# Patient Record
Sex: MALE | Race: WHITE
[De-identification: names, ages, dates, MRNs, and addresses within clinical notes are randomized per-mention and may not be internally consistent; named-entity substitution may affect disease eponyms.]

---

## 2018-10-10 ENCOUNTER — HOSPITAL ENCOUNTER (EMERGENCY)
Dept: HOSPITAL 25 - UCCORT | Age: 28
Discharge: HOME | End: 2018-10-10
Payer: COMMERCIAL
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
## 2018-10-10 DIAGNOSIS — T63.481A: ICD-10-CM

## 2018-10-10 DIAGNOSIS — Z87.891: ICD-10-CM

## 2018-10-10 DIAGNOSIS — L53.0: ICD-10-CM

## 2018-10-10 DIAGNOSIS — Z88.1: Primary | ICD-10-CM

## 2018-10-10 PROCEDURE — 99201: CPT

## 2018-10-10 PROCEDURE — G0463 HOSPITAL OUTPT CLINIC VISIT: HCPCS

## 2018-10-10 NOTE — UC
Skin Complaint HPI





- HPI Summary


HPI Summary: 





Found a tick on right upper arm yesterday afternoon.  Removed the tick but is 

concerned that part of it remains.  Thinks tick was attached for several hours 

at most.  Denies any fever, headache, rash, malaise.





- History of Current Complaint


Chief Complaint: UCSkin


Time Seen by Provider: 10/10/18 21:38


Stated Complaint: TICK


Hx Obtained From: Patient


Onset/Duration: Sudden Onset, Lasting Hours


Onset Severity: Mild


Current Severity: Mild


Pain Intensity: 3


Pain Scale Used: 0-10 Numeric


Location: Discrete - RIGHT UPPER ARM


Character: Pain, Redness


Aggravating Factor(s): Touch


Alleviating Factor(s): Nothing


Associated Signs & Symptoms: Positive: Tenderness.  Negative: Nausea, Fever


Related History: Insect Bite/Sting





- Allergy/Home Medications


Allergies/Adverse Reactions: 


 Allergies











Allergy/AdvReac Type Severity Reaction Status Date / Time


 


[facility]  Unknown Verified 10/10/18 21:15





   Reaction  





   Details  


 


cefzil Allergy  Unknown Uncoded 10/10/18 21:15





   Reaction  





   Details  











Home Medications: 


 Home Medications





NK [No Home Medications Reported]  10/10/18 [History Confirmed 10/10/18]











Review of Systems


Constitutional: Negative


Skin: Rash


Respiratory: Negative


Cardiovascular: Negative


Gastrointestinal: Negative


Neurological: Negative


All Other Systems Reviewed And Are Negative: Yes





PMH/Surg Hx/FS Hx/Imm Hx


Previously Healthy: Yes





- Surgical History


Surgical History: Yes


Surgery Procedure, Year, and Place: appy, hypospadia, left knee cartilage





- Family History


Known Family History: 


   Negative: Hypertension





- Social History


Alcohol Use: Weekly


Alcohol Amount: 12


Substance Use Type: None


Smoking Status (MU): Former Smoker





Physical Exam


Triage Information Reviewed: Yes


Appearance: Well-Appearing, No Pain Distress, Well-Nourished


Vital Signs: 


 Initial Vital Signs











Temp  98 F   10/10/18 21:05


 


Pulse  62   10/10/18 21:05


 


Resp  22   10/10/18 21:05


 


BP  139/73   10/10/18 21:05


 


Pulse Ox  99   10/10/18 21:05











Vital Signs Reviewed: Yes


Eyes: Positive: Conjunctiva Clear


ENT: Positive: Hearing grossly normal


Neck: Positive: Supple


Respiratory: Positive: No respiratory distress, No accessory muscle use


Cardiovascular: Positive: Pulses Normal


Abdomen Description: Positive: Soft


Musculoskeletal: Positive: No Edema


Neurological: Positive: Alert


Psychological: Positive: Age Appropriate Behavior


Skin: Positive: rashes - 1.5CM AREA OF ERYTHEMA SURROUNDING DARK CENTRAL 

PUNCTUM (TICK BITE SITE). SLIGHTLY RAISED AND TENDER





Course/Dx





- Course


Course Of Treatment: TICK PARTS REMOVED USING 18GAUGE NEEDLE AND SPLINTER 

FORCEPS.





- Diagnoses


Provider Diagnoses: TICK BITE





Discharge





- Sign-Out/Discharge


Documenting (check all that apply): Patient Departure


All imaging exams completed and their final reports reviewed: No Studies





- Discharge Plan


Condition: Stable


Disposition: HOME


Patient Education Materials:  Tick Bite (ED)


Referrals: 


Lonnie Jaramillo DO [Primary Care Provider] - If Needed


Additional Instructions: 


TICK BITE PROPHYLAXIS


The Infectious [employer] Society of [place] (IDSA) does not generally recommend 

antimicrobial prophylaxis for prevention of Lyme disease after a recognized 

tick bite. However, in areas that are highly endemic for Lyme disease, a single 

dose of doxycycline may be offered to adult patients (200 mg) who are not 

pregnant and to children older than 8 years of age (4 mg/kg up to a maximum 

dose of 200 mg) when all of the following circumstances exist:





CRITERIA FOR RECEIVING PROPHYLACTIC TREATMENT FOR LYME DISEASE


1) TICK ATTACHED FOR AT LEAST 36 HRS


2) TICK IS AN ADULT OR [place] DEER TICK


3) YOU LIVE IN AN AREA WHERE LYME DISEASE IS PREVALENT (i.e., [place], [place], [place], [place], [place]

, [place], [place], [place], [place], [place], [place], [place], [place], [place])


4) YOU HAVE NO CONTRAINDICATION TO THE MEDICATION (DOXYCYCLINE)


5) PROPHYLAXIS IS BEGUN WITHIN 72 HRS OF TICK REMOVAL





SINCE YOU DO NOT MEET ALL THESE CRITERIA THERE IS NO NEED TO GIVE YOU 

PROPHYLACTIC ANTIBIOTICS. YOUR CHANCES OF DEVELOPING LYME DISEASE ARE EXTREMELY 

SMALL. BE VIGILANT OF YOUR SYMPTOMS AND DON'T HESITATE TO GET SEEN AGAIN IF YOU 

DEVELOP UNEXPLAINED FEVER, HEADACHE, JOINT PAIN, BODY ACHES, RASH OR ANY OTHER 

CONCERNING SYMPTOMS.





Antibiotic treatment following a tick bite is not recommended as a means to 

prevent anaplasmosis, babesiosis, ehrlichiosis, or [place] spotted 

fever. There is no evidence this practice is effective, and it may simply delay 

onset of disease. Instead, persons who experience a tick bite should be alert 

for symptoms suggestive of tickborne illness and consult a physician if fever, 

rash, or other symptoms of concern develop.





- Billing Disposition and Condition


Condition: STABLE


Disposition: Home